# Patient Record
Sex: FEMALE | Race: BLACK OR AFRICAN AMERICAN | NOT HISPANIC OR LATINO
[De-identification: names, ages, dates, MRNs, and addresses within clinical notes are randomized per-mention and may not be internally consistent; named-entity substitution may affect disease eponyms.]

---

## 2019-05-21 ENCOUNTER — RESULT REVIEW (OUTPATIENT)
Age: 54
End: 2019-05-21

## 2023-03-29 ENCOUNTER — OFFICE (OUTPATIENT)
Dept: URBAN - METROPOLITAN AREA CLINIC 12 | Facility: CLINIC | Age: 58
Setting detail: OPHTHALMOLOGY
End: 2023-03-29
Payer: COMMERCIAL

## 2023-03-29 DIAGNOSIS — H25.13: ICD-10-CM

## 2023-03-29 DIAGNOSIS — H35.3131: ICD-10-CM

## 2023-03-29 DIAGNOSIS — H35.372: ICD-10-CM

## 2023-03-29 PROCEDURE — 92250 FUNDUS PHOTOGRAPHY W/I&R: CPT | Performed by: OPHTHALMOLOGY

## 2023-03-29 PROCEDURE — 99204 OFFICE O/P NEW MOD 45 MIN: CPT | Performed by: OPHTHALMOLOGY

## 2023-03-29 ASSESSMENT — KERATOMETRY
OD_K2POWER_DIOPTERS: 46.00
OS_AXISANGLE_DEGREES: 103
OD_K1POWER_DIOPTERS: 43.00
OD_AXISANGLE_DEGREES: 88
OS_K1POWER_DIOPTERS: 43.50
OS_K2POWER_DIOPTERS: 45.75

## 2023-03-29 ASSESSMENT — AXIALLENGTH_DERIVED
OS_AL: 23.9637
OS_AL: 23.9637
OD_AL: 24.5264
OD_AL: 24.5264

## 2023-03-29 ASSESSMENT — REFRACTION_CURRENTRX
OS_SPHERE: -1.25
OD_SPHERE: -0.75
OS_CYLINDER: -1.75
OD_OVR_VA: 20/
OD_VPRISM_DIRECTION: PROGS
OD_AXIS: 13
OS_AXIS: 13
OS_VPRISM_DIRECTION: PROGS
OS_OVR_VA: 20/
OD_CYLINDER: -2.75
OS_ADD: +2.75
OD_ADD: +2.75

## 2023-03-29 ASSESSMENT — REFRACTION_AUTOREFRACTION
OD_AXIS: 8
OS_CYLINDER: -2.50
OD_SPHERE: -1.75
OD_CYLINDER: -3.00
OS_AXIS: 15
OS_SPHERE: -0.75

## 2023-03-29 ASSESSMENT — REFRACTION_MANIFEST
OD_AXIS: 8
OS_SPHERE: -0.75
OD_SPHERE: -1.75
OS_AXIS: 15
OS_CYLINDER: -2.50
OD_CYLINDER: -3.00
OD_VA1: 20/40-1
OS_VA1: 20/25

## 2023-03-29 ASSESSMENT — SPHEQUIV_DERIVED
OS_SPHEQUIV: -2
OD_SPHEQUIV: -3.25
OS_SPHEQUIV: -2
OD_SPHEQUIV: -3.25

## 2023-03-29 ASSESSMENT — TONOMETRY
OD_IOP_MMHG: 15
OS_IOP_MMHG: 15

## 2023-03-29 ASSESSMENT — CONFRONTATIONAL VISUAL FIELD TEST (CVF)
OD_FINDINGS: FULL
OS_FINDINGS: FULL

## 2023-03-29 ASSESSMENT — VISUAL ACUITY
OD_BCVA: 20/60
OS_BCVA: 20/150

## 2023-05-15 ENCOUNTER — TRANSCRIPTION ENCOUNTER (OUTPATIENT)
Age: 58
End: 2023-05-15

## 2023-05-22 ENCOUNTER — TRANSCRIPTION ENCOUNTER (OUTPATIENT)
Age: 58
End: 2023-05-22

## 2023-05-24 ENCOUNTER — TRANSCRIPTION ENCOUNTER (OUTPATIENT)
Age: 58
End: 2023-05-24

## 2023-06-19 ENCOUNTER — TRANSCRIPTION ENCOUNTER (OUTPATIENT)
Age: 58
End: 2023-06-19

## 2023-06-20 ENCOUNTER — TRANSCRIPTION ENCOUNTER (OUTPATIENT)
Age: 58
End: 2023-06-20

## 2023-07-11 ENCOUNTER — TRANSCRIPTION ENCOUNTER (OUTPATIENT)
Age: 58
End: 2023-07-11

## 2023-07-19 ENCOUNTER — OFFICE (OUTPATIENT)
Dept: URBAN - METROPOLITAN AREA CLINIC 12 | Facility: CLINIC | Age: 58
Setting detail: OPHTHALMOLOGY
End: 2023-07-19
Payer: COMMERCIAL

## 2023-07-19 DIAGNOSIS — H25.13: ICD-10-CM

## 2023-07-19 DIAGNOSIS — H25.11: ICD-10-CM

## 2023-07-19 PROCEDURE — 99213 OFFICE O/P EST LOW 20 MIN: CPT | Performed by: OPHTHALMOLOGY

## 2023-07-19 PROCEDURE — 92136 OPHTHALMIC BIOMETRY: CPT | Performed by: OPHTHALMOLOGY

## 2023-07-19 ASSESSMENT — REFRACTION_CURRENTRX
OD_AXIS: 13
OS_VPRISM_DIRECTION: PROGS
OD_CYLINDER: -2.75
OD_VPRISM_DIRECTION: PROGS
OD_SPHERE: -0.75
OS_OVR_VA: 20/
OS_CYLINDER: -1.75
OS_AXIS: 13
OD_OVR_VA: 20/
OS_SPHERE: -1.25
OD_ADD: +2.75
OS_ADD: +2.75

## 2023-07-19 ASSESSMENT — AXIALLENGTH_DERIVED
OS_AL: 24.0588
OD_AL: 24.5761
OD_AL: 25.1716
OS_AL: 24.0588

## 2023-07-19 ASSESSMENT — REFRACTION_AUTOREFRACTION
OD_CYLINDER: -2.75
OD_AXIS: 010
OD_SPHERE: -3.25
OS_CYLINDER: -2.00
OS_AXIS: 015
OS_SPHERE: -1.00

## 2023-07-19 ASSESSMENT — CONFRONTATIONAL VISUAL FIELD TEST (CVF)
OS_FINDINGS: FULL
OD_FINDINGS: FULL

## 2023-07-19 ASSESSMENT — TONOMETRY
OS_IOP_MMHG: 17
OD_IOP_MMHG: 16

## 2023-07-19 ASSESSMENT — REFRACTION_MANIFEST
OS_CYLINDER: -2.50
OS_SPHERE: -0.75
OS_AXIS: 15
OD_VA1: 20/40-1
OD_AXIS: 8
OD_SPHERE: -1.75
OS_VA1: 20/25
OD_CYLINDER: -3.00

## 2023-07-19 ASSESSMENT — SPHEQUIV_DERIVED
OS_SPHEQUIV: -2
OD_SPHEQUIV: -3.25
OS_SPHEQUIV: -2
OD_SPHEQUIV: -4.625

## 2023-07-19 ASSESSMENT — KERATOMETRY
OS_K2POWER_DIOPTERS: 45.25
OD_K1POWER_DIOPTERS: 43.25
OS_K1POWER_DIOPTERS: 43.50
OD_AXISANGLE_DEGREES: 091
OS_AXISANGLE_DEGREES: 097
OD_K2POWER_DIOPTERS: 45.50

## 2023-07-19 ASSESSMENT — VISUAL ACUITY
OS_BCVA: 20/80-1
OD_BCVA: 20/25-1

## 2023-08-01 ENCOUNTER — ASC (OUTPATIENT)
Dept: URBAN - METROPOLITAN AREA SURGERY 8 | Facility: SURGERY | Age: 58
Setting detail: OPHTHALMOLOGY
End: 2023-08-01
Payer: COMMERCIAL

## 2023-08-01 DIAGNOSIS — H52.211: ICD-10-CM

## 2023-08-01 DIAGNOSIS — H25.11: ICD-10-CM

## 2023-08-01 PROCEDURE — FEMTO FEMTOSECOND LASER: Performed by: OPHTHALMOLOGY

## 2023-08-01 PROCEDURE — V2787 ASTIGMATISM-CORRECT FUNCTION: HCPCS | Performed by: OPHTHALMOLOGY

## 2023-08-01 PROCEDURE — 66984 XCAPSL CTRC RMVL W/O ECP: CPT | Performed by: OPHTHALMOLOGY

## 2023-08-02 ENCOUNTER — RX ONLY (RX ONLY)
Age: 58
End: 2023-08-02

## 2023-08-02 ENCOUNTER — OFFICE (OUTPATIENT)
Dept: URBAN - METROPOLITAN AREA CLINIC 12 | Facility: CLINIC | Age: 58
Setting detail: OPHTHALMOLOGY
End: 2023-08-02
Payer: COMMERCIAL

## 2023-08-02 DIAGNOSIS — Z96.1: ICD-10-CM

## 2023-08-02 PROCEDURE — 99024 POSTOP FOLLOW-UP VISIT: CPT | Performed by: OPTOMETRIST

## 2023-08-02 ASSESSMENT — KERATOMETRY
OD_AXISANGLE_DEGREES: 089
OS_K1POWER_DIOPTERS: 43.25
METHOD_AUTO_MANUAL: AUTO
OD_K2POWER_DIOPTERS: 45.50
OS_AXISANGLE_DEGREES: 104
OD_K1POWER_DIOPTERS: 43.00
OS_K2POWER_DIOPTERS: 45.75

## 2023-08-02 ASSESSMENT — REFRACTION_CURRENTRX
OD_ADD: +2.75
OS_AXIS: 13
OS_CYLINDER: -1.75
OS_ADD: +2.75
OD_AXIS: 13
OD_VPRISM_DIRECTION: PROGS
OS_OVR_VA: 20/
OD_CYLINDER: -2.75
OD_OVR_VA: 20/
OD_SPHERE: -0.75
OS_SPHERE: -1.25
OS_VPRISM_DIRECTION: PROGS

## 2023-08-02 ASSESSMENT — VISUAL ACUITY
OS_BCVA: 20/30-2
OD_BCVA: 20/50-2

## 2023-08-02 ASSESSMENT — CONFRONTATIONAL VISUAL FIELD TEST (CVF)
OD_FINDINGS: FULL
OS_FINDINGS: FULL

## 2023-08-02 ASSESSMENT — REFRACTION_AUTOREFRACTION
OS_CYLINDER: -2.00
OD_SPHERE: -0.25
OS_SPHERE: -0.75
OD_CYLINDER: -1.00
OD_AXIS: 172
OS_AXIS: 018

## 2023-08-02 ASSESSMENT — REFRACTION_MANIFEST
OD_AXIS: 8
OS_SPHERE: -0.75
OS_VA1: 20/25
OD_CYLINDER: -3.00
OD_VA1: 20/40-1
OS_AXIS: 15
OD_SPHERE: -1.75
OS_CYLINDER: -2.50

## 2023-08-02 ASSESSMENT — SPHEQUIV_DERIVED
OS_SPHEQUIV: -2
OD_SPHEQUIV: -3.25
OS_SPHEQUIV: -1.75
OD_SPHEQUIV: -0.75

## 2023-08-02 ASSESSMENT — AXIALLENGTH_DERIVED
OD_AL: 24.6261
OS_AL: 24.0111
OD_AL: 23.6086
OS_AL: 23.9107

## 2023-08-02 ASSESSMENT — TONOMETRY: OS_IOP_MMHG: 17

## 2023-08-07 ENCOUNTER — OFFICE (OUTPATIENT)
Dept: URBAN - METROPOLITAN AREA CLINIC 12 | Facility: CLINIC | Age: 58
Setting detail: OPHTHALMOLOGY
End: 2023-08-07
Payer: COMMERCIAL

## 2023-08-07 DIAGNOSIS — H25.12: ICD-10-CM

## 2023-08-07 PROCEDURE — 92136 OPHTHALMIC BIOMETRY: CPT | Performed by: OPHTHALMOLOGY

## 2023-08-07 ASSESSMENT — REFRACTION_MANIFEST
OD_AXIS: 8
OS_AXIS: 15
OD_CYLINDER: -3.00
OS_VA1: 20/25
OD_VA1: 20/40-1
OS_SPHERE: -0.75
OS_CYLINDER: -2.50
OD_SPHERE: -1.75

## 2023-08-07 ASSESSMENT — TONOMETRY
OS_IOP_MMHG: 14
OD_IOP_MMHG: 16

## 2023-08-07 ASSESSMENT — REFRACTION_CURRENTRX
OS_OVR_VA: 20/
OD_OVR_VA: 20/
OD_ADD: +2.75
OD_CYLINDER: -2.75
OD_VPRISM_DIRECTION: PROGS
OS_VPRISM_DIRECTION: PROGS
OD_SPHERE: -0.75
OS_AXIS: 13
OD_AXIS: 13
OS_ADD: +2.75
OS_SPHERE: -1.25
OS_CYLINDER: -1.75

## 2023-08-07 ASSESSMENT — SPHEQUIV_DERIVED
OD_SPHEQUIV: -3.25
OS_SPHEQUIV: -2
OS_SPHEQUIV: -2
OD_SPHEQUIV: -0.5

## 2023-08-07 ASSESSMENT — REFRACTION_AUTOREFRACTION
OS_SPHERE: -0.75
OD_SPHERE: -0.25
OS_CYLINDER: -2.50
OD_AXIS: 007
OD_CYLINDER: -0.50
OS_AXIS: 012

## 2023-08-07 ASSESSMENT — AXIALLENGTH_DERIVED
OD_AL: 23.4207
OD_AL: 24.5264
OS_AL: 24.0111
OS_AL: 24.0111

## 2023-08-07 ASSESSMENT — KERATOMETRY
OS_AXISANGLE_DEGREES: 099
OS_K2POWER_DIOPTERS: 45.75
METHOD_AUTO_MANUAL: AUTO
OD_K2POWER_DIOPTERS: 45.75
OS_K1POWER_DIOPTERS: 43.25
OD_K1POWER_DIOPTERS: 43.25
OD_AXISANGLE_DEGREES: 090

## 2023-08-07 ASSESSMENT — VISUAL ACUITY
OD_BCVA: 20/60-1
OS_BCVA: 20/30-2

## 2023-08-07 ASSESSMENT — CONFRONTATIONAL VISUAL FIELD TEST (CVF)
OS_FINDINGS: FULL
OD_FINDINGS: FULL

## 2023-08-15 ENCOUNTER — ASC (OUTPATIENT)
Dept: URBAN - METROPOLITAN AREA SURGERY 8 | Facility: SURGERY | Age: 58
Setting detail: OPHTHALMOLOGY
End: 2023-08-15
Payer: COMMERCIAL

## 2023-08-15 DIAGNOSIS — H25.12: ICD-10-CM

## 2023-08-15 DIAGNOSIS — H52.212: ICD-10-CM

## 2023-08-15 PROCEDURE — 66984 XCAPSL CTRC RMVL W/O ECP: CPT | Performed by: OPHTHALMOLOGY

## 2023-08-15 PROCEDURE — V2787 ASTIGMATISM-CORRECT FUNCTION: HCPCS | Performed by: OPHTHALMOLOGY

## 2023-08-15 PROCEDURE — FEMTO FEMTOSECOND LASER: Performed by: OPHTHALMOLOGY

## 2023-08-16 ENCOUNTER — OFFICE (OUTPATIENT)
Dept: URBAN - METROPOLITAN AREA CLINIC 12 | Facility: CLINIC | Age: 58
Setting detail: OPHTHALMOLOGY
End: 2023-08-16
Payer: COMMERCIAL

## 2023-08-16 DIAGNOSIS — Z96.1: ICD-10-CM

## 2023-08-16 PROCEDURE — 99024 POSTOP FOLLOW-UP VISIT: CPT | Performed by: OPTOMETRIST

## 2023-08-16 ASSESSMENT — REFRACTION_CURRENTRX
OS_VPRISM_DIRECTION: PROGS
OD_AXIS: 13
OS_ADD: +2.75
OD_VPRISM_DIRECTION: PROGS
OS_SPHERE: -1.25
OS_AXIS: 13
OD_CYLINDER: -2.75
OD_OVR_VA: 20/
OS_CYLINDER: -1.75
OD_ADD: +2.75
OD_SPHERE: -0.75
OS_OVR_VA: 20/

## 2023-08-16 ASSESSMENT — REFRACTION_AUTOREFRACTION
OS_CYLINDER: -1.00
OD_CYLINDER: -0.25
OS_AXIS: 174
OS_SPHERE: PLANO
OD_SPHERE: -0.25
OD_AXIS: 179

## 2023-08-16 ASSESSMENT — VISUAL ACUITY
OD_BCVA: 20/25-2
OS_BCVA: 20/20-2

## 2023-08-16 ASSESSMENT — CONFRONTATIONAL VISUAL FIELD TEST (CVF)
OS_FINDINGS: FULL
OD_FINDINGS: FULL

## 2023-08-16 ASSESSMENT — KERATOMETRY
OD_K1POWER_DIOPTERS: 43.00
OD_K2POWER_DIOPTERS: 45.50
OS_AXISANGLE_DEGREES: 099
OS_K1POWER_DIOPTERS: 43.00
OS_K2POWER_DIOPTERS: 45.75
OD_AXISANGLE_DEGREES: 093
METHOD_AUTO_MANUAL: AUTO

## 2023-08-16 ASSESSMENT — SPHEQUIV_DERIVED
OS_SPHEQUIV: -2
OD_SPHEQUIV: -3.25
OD_SPHEQUIV: -0.375

## 2023-08-16 ASSESSMENT — REFRACTION_MANIFEST
OD_VA1: 20/40-1
OD_CYLINDER: -3.00
OS_VA1: 20/25
OS_SPHERE: -0.75
OS_CYLINDER: -2.50
OS_AXIS: 15
OD_AXIS: 8
OD_SPHERE: -1.75

## 2023-08-16 ASSESSMENT — TONOMETRY
OD_IOP_MMHG: 18
OS_IOP_MMHG: 18

## 2023-08-16 ASSESSMENT — AXIALLENGTH_DERIVED
OD_AL: 24.6261
OS_AL: 24.0588
OD_AL: 23.4632

## 2023-08-24 ENCOUNTER — OFFICE (OUTPATIENT)
Dept: URBAN - METROPOLITAN AREA CLINIC 12 | Facility: CLINIC | Age: 58
Setting detail: OPHTHALMOLOGY
End: 2023-08-24
Payer: COMMERCIAL

## 2023-08-24 DIAGNOSIS — Z96.1: ICD-10-CM

## 2023-08-24 PROCEDURE — 99024 POSTOP FOLLOW-UP VISIT: CPT | Performed by: OPHTHALMOLOGY

## 2023-08-24 ASSESSMENT — TONOMETRY
OS_IOP_MMHG: 15
OD_IOP_MMHG: 17

## 2023-08-24 ASSESSMENT — SPHEQUIV_DERIVED
OD_SPHEQUIV: -0.375
OS_SPHEQUIV: -0.375
OS_SPHEQUIV: -2
OD_SPHEQUIV: -3.25

## 2023-08-24 ASSESSMENT — REFRACTION_CURRENTRX
OD_CYLINDER: -2.75
OS_SPHERE: -1.25
OD_ADD: +2.75
OD_VPRISM_DIRECTION: PROGS
OS_CYLINDER: -1.75
OS_VPRISM_DIRECTION: PROGS
OD_AXIS: 13
OS_AXIS: 13
OS_OVR_VA: 20/
OS_ADD: +2.75
OD_SPHERE: -0.75
OD_OVR_VA: 20/

## 2023-08-24 ASSESSMENT — REFRACTION_MANIFEST
OD_SPHERE: -1.75
OS_CYLINDER: -2.50
OS_VA1: 20/25
OD_VA1: 20/40-1
OD_AXIS: 8
OS_AXIS: 15
OS_SPHERE: -0.75
OD_CYLINDER: -3.00

## 2023-08-24 ASSESSMENT — REFRACTION_AUTOREFRACTION
OD_AXIS: 027
OS_SPHERE: +0.25
OS_AXIS: 168
OD_SPHERE: -0.25
OD_CYLINDER: -0.25
OS_CYLINDER: -1.25

## 2023-08-24 ASSESSMENT — AXIALLENGTH_DERIVED
OS_AL: 24.0588
OS_AL: 23.4179
OD_AL: 23.4632
OD_AL: 24.6261

## 2023-08-24 ASSESSMENT — VISUAL ACUITY
OD_BCVA: 20/20
OS_BCVA: 20/30-1

## 2023-08-24 ASSESSMENT — KERATOMETRY
OD_AXISANGLE_DEGREES: 094
OD_K1POWER_DIOPTERS: 43.00
METHOD_AUTO_MANUAL: AUTO
OS_K1POWER_DIOPTERS: 43.00
OS_AXISANGLE_DEGREES: 099
OD_K2POWER_DIOPTERS: 45.50
OS_K2POWER_DIOPTERS: 45.75

## 2023-08-24 ASSESSMENT — CONFRONTATIONAL VISUAL FIELD TEST (CVF)
OD_FINDINGS: FULL
OS_FINDINGS: FULL

## 2023-09-19 ENCOUNTER — OFFICE (OUTPATIENT)
Dept: URBAN - METROPOLITAN AREA CLINIC 12 | Facility: CLINIC | Age: 58
Setting detail: OPHTHALMOLOGY
End: 2023-09-19
Payer: COMMERCIAL

## 2023-09-19 DIAGNOSIS — Z96.1: ICD-10-CM

## 2023-09-19 PROCEDURE — 99024 POSTOP FOLLOW-UP VISIT: CPT | Performed by: OPHTHALMOLOGY

## 2023-09-19 ASSESSMENT — AXIALLENGTH_DERIVED
OS_AL: 23.9637
OD_AL: 25.9284
OD_AL: 27.2255

## 2023-09-19 ASSESSMENT — KERATOMETRY
OS_AXISANGLE_DEGREES: 098
METHOD_AUTO_MANUAL: AUTO
OS_K2POWER_DIOPTERS: 46.00
OD_AXISANGLE_DEGREES: 006
OS_K1POWER_DIOPTERS: 43.25
OD_K1POWER_DIOPTERS: 33.75
OD_K2POWER_DIOPTERS: 43.00

## 2023-09-19 ASSESSMENT — VISUAL ACUITY
OD_BCVA: 20/25-1
OS_BCVA: 20/25-2

## 2023-09-19 ASSESSMENT — REFRACTION_MANIFEST
OS_CYLINDER: -2.50
OS_AXIS: 15
OS_SPHERE: -0.75
OD_VA1: 20/40-1
OD_CYLINDER: -3.00
OD_SPHERE: -1.75
OS_VA1: 20/25
OD_AXIS: 8

## 2023-09-19 ASSESSMENT — REFRACTION_CURRENTRX
OD_OVR_VA: 20/
OS_VPRISM_DIRECTION: PROGS
OS_AXIS: 13
OS_OVR_VA: 20/
OD_CYLINDER: -2.75
OS_SPHERE: -1.25
OS_ADD: +2.75
OD_VPRISM_DIRECTION: PROGS
OD_SPHERE: -0.75
OS_CYLINDER: -1.75
OD_ADD: +2.75
OD_AXIS: 13

## 2023-09-19 ASSESSMENT — SPHEQUIV_DERIVED
OS_SPHEQUIV: -2
OD_SPHEQUIV: -3.25
OD_SPHEQUIV: -0.625

## 2023-09-19 ASSESSMENT — REFRACTION_AUTOREFRACTION
OS_SPHERE: PLANO
OS_CYLINDER: -1.50
OD_AXIS: 004
OD_SPHERE: -0.25
OS_AXIS: 174
OD_CYLINDER: -0.75

## 2023-09-19 ASSESSMENT — TONOMETRY: OS_IOP_MMHG: 14

## 2023-09-19 ASSESSMENT — CONFRONTATIONAL VISUAL FIELD TEST (CVF)
OD_FINDINGS: FULL
OS_FINDINGS: FULL

## 2023-09-28 ENCOUNTER — OFFICE (OUTPATIENT)
Dept: URBAN - METROPOLITAN AREA CLINIC 12 | Facility: CLINIC | Age: 58
Setting detail: OPHTHALMOLOGY
End: 2023-09-28
Payer: COMMERCIAL

## 2023-09-28 DIAGNOSIS — Z96.1: ICD-10-CM

## 2023-09-28 PROCEDURE — 99024 POSTOP FOLLOW-UP VISIT: CPT | Performed by: OPHTHALMOLOGY

## 2023-09-28 ASSESSMENT — REFRACTION_CURRENTRX
OD_VPRISM_DIRECTION: PROGS
OS_CYLINDER: -1.75
OD_CYLINDER: -2.75
OS_OVR_VA: 20/
OD_SPHERE: -0.75
OD_ADD: +2.75
OD_AXIS: 13
OS_SPHERE: -1.25
OS_AXIS: 13
OS_VPRISM_DIRECTION: PROGS
OS_ADD: +2.75
OD_OVR_VA: 20/

## 2023-09-28 ASSESSMENT — TONOMETRY: OD_IOP_MMHG: 10

## 2023-09-28 ASSESSMENT — KERATOMETRY
OD_AXISANGLE_DEGREES: 96
OD_K2POWER_DIOPTERS: 45.25
OD_K1POWER_DIOPTERS: 43.00
OS_K2POWER_DIOPTERS: 46.75
METHOD_AUTO_MANUAL: AUTO
OS_K1POWER_DIOPTERS: 43.25
OS_AXISANGLE_DEGREES: 99

## 2023-09-28 ASSESSMENT — REFRACTION_MANIFEST
OD_VA1: 20/40-1
OS_CYLINDER: -2.50
OS_VA1: 20/25
OS_SPHERE: -0.75
OD_SPHERE: -1.75
OD_CYLINDER: -3.00
OD_AXIS: 8
OS_AXIS: 15

## 2023-09-28 ASSESSMENT — REFRACTION_AUTOREFRACTION
OD_AXIS: 7
OS_CYLINDER: -1.25
OD_SPHERE: PLANO
OS_AXIS: 171
OD_CYLINDER: -1.00
OS_SPHERE: PLANO

## 2023-09-28 ASSESSMENT — VISUAL ACUITY
OD_BCVA: 20/25+1
OS_BCVA: 20/25-1

## 2023-09-28 ASSESSMENT — AXIALLENGTH_DERIVED
OD_AL: 24.6762
OS_AL: 23.8224

## 2023-09-28 ASSESSMENT — CONFRONTATIONAL VISUAL FIELD TEST (CVF)
OD_FINDINGS: FULL
OS_FINDINGS: FULL

## 2023-09-28 ASSESSMENT — SPHEQUIV_DERIVED
OS_SPHEQUIV: -2
OD_SPHEQUIV: -3.25

## 2023-10-09 ENCOUNTER — OFFICE (OUTPATIENT)
Dept: URBAN - METROPOLITAN AREA CLINIC 12 | Facility: CLINIC | Age: 58
Setting detail: OPHTHALMOLOGY
End: 2023-10-09
Payer: COMMERCIAL

## 2023-10-09 DIAGNOSIS — Z96.1: ICD-10-CM

## 2023-10-09 PROCEDURE — 99024 POSTOP FOLLOW-UP VISIT: CPT | Performed by: OPTOMETRIST

## 2023-10-09 ASSESSMENT — CONFRONTATIONAL VISUAL FIELD TEST (CVF)
OD_FINDINGS: FULL
OS_FINDINGS: FULL

## 2023-10-09 ASSESSMENT — VISUAL ACUITY
OD_BCVA: 20/25
OS_BCVA: 20/25

## 2023-10-09 ASSESSMENT — REFRACTION_CURRENTRX
OD_OVR_VA: 20/
OD_AXIS: 13
OD_VPRISM_DIRECTION: PROGS
OS_VPRISM_DIRECTION: PROGS
OD_SPHERE: -0.75
OS_ADD: +2.75
OS_CYLINDER: -1.75
OD_CYLINDER: -2.75
OS_AXIS: 13
OS_SPHERE: -1.25
OD_ADD: +2.75
OS_OVR_VA: 20/

## 2023-10-09 ASSESSMENT — REFRACTION_MANIFEST
OD_SPHERE: -1.75
OS_AXIS: 15
OS_VA1: 20/25
OD_AXIS: 8
OD_CYLINDER: -3.00
OS_SPHERE: -0.75
OS_CYLINDER: -2.50
OD_VA1: 20/40-1

## 2023-10-09 ASSESSMENT — AXIALLENGTH_DERIVED
OD_AL: 23.4687
OD_AL: 24.5264
OS_AL: 23.9637

## 2023-10-09 ASSESSMENT — KERATOMETRY
OD_K1POWER_DIOPTERS: 43.25
METHOD_AUTO_MANUAL: AUTO
OD_K2POWER_DIOPTERS: 45.75
OS_K2POWER_DIOPTERS: 46.25
OD_AXISANGLE_DEGREES: 93
OS_K1POWER_DIOPTERS: 43.00
OS_AXISANGLE_DEGREES: 97

## 2023-10-09 ASSESSMENT — REFRACTION_AUTOREFRACTION
OS_CYLINDER: -1.25
OD_AXIS: 6
OS_AXIS: 169
OD_CYLINDER: -0.75
OD_SPHERE: -0.25
OS_SPHERE: PLANO

## 2023-10-09 ASSESSMENT — TONOMETRY
OS_IOP_MMHG: 14
OD_IOP_MMHG: 14

## 2023-10-09 ASSESSMENT — SPHEQUIV_DERIVED
OD_SPHEQUIV: -0.625
OD_SPHEQUIV: -3.25
OS_SPHEQUIV: -2

## 2024-01-09 ENCOUNTER — RX ONLY (RX ONLY)
Age: 59
End: 2024-01-09

## 2024-01-09 ENCOUNTER — OFFICE (OUTPATIENT)
Dept: URBAN - METROPOLITAN AREA CLINIC 12 | Facility: CLINIC | Age: 59
Setting detail: OPHTHALMOLOGY
End: 2024-01-09
Payer: COMMERCIAL

## 2024-01-09 DIAGNOSIS — H35.3131: ICD-10-CM

## 2024-01-09 DIAGNOSIS — Z96.1: ICD-10-CM

## 2024-01-09 DIAGNOSIS — H16.223: ICD-10-CM

## 2024-01-09 DIAGNOSIS — H35.372: ICD-10-CM

## 2024-01-09 PROBLEM — H52.7 REFRACTIVE ERROR: Status: ACTIVE | Noted: 2024-01-09

## 2024-01-09 PROCEDURE — 92134 CPTRZ OPH DX IMG PST SGM RTA: CPT | Performed by: OPTOMETRIST

## 2024-01-09 PROCEDURE — 92014 COMPRE OPH EXAM EST PT 1/>: CPT | Performed by: OPTOMETRIST

## 2024-01-09 ASSESSMENT — REFRACTION_AUTOREFRACTION
OS_CYLINDER: -1.00
OS_SPHERE: -0.25
OS_AXIS: 166
OD_CYLINDER: -0.75
OD_SPHERE: -0.25
OD_AXIS: 015

## 2024-01-09 ASSESSMENT — REFRACTION_MANIFEST
OS_ADD: +2.75
OD_CYLINDER: -0.50
OD_ADD: +2.75
OS_SPHERE: PLANO
OS_VA1: 20/25
OD_VA1: 20/40-1
OD_SPHERE: -0.25
OS_AXIS: 160
OD_AXIS: 005
OS_CYLINDER: -0.75

## 2024-01-09 ASSESSMENT — CONFRONTATIONAL VISUAL FIELD TEST (CVF)
OD_FINDINGS: FULL
OS_FINDINGS: FULL

## 2024-01-09 ASSESSMENT — REFRACTION_CURRENTRX
OS_AXIS: 13
OD_ADD: +2.75
OD_OVR_VA: 20/
OS_ADD: +2.75
OS_OVR_VA: 20/
OD_VPRISM_DIRECTION: PROGS
OS_SPHERE: -1.25
OD_CYLINDER: -2.75
OD_AXIS: 13
OD_SPHERE: -0.75
OS_CYLINDER: -1.75
OS_VPRISM_DIRECTION: PROGS

## 2024-01-09 ASSESSMENT — SPHEQUIV_DERIVED
OS_SPHEQUIV: -0.75
OD_SPHEQUIV: -0.625
OD_SPHEQUIV: -0.5

## 2024-01-09 ASSESSMENT — SUPERFICIAL PUNCTATE KERATITIS (SPK)
OD_SPK: 1+
OS_SPK: 1+

## 2024-03-13 ENCOUNTER — OFFICE (OUTPATIENT)
Dept: URBAN - METROPOLITAN AREA CLINIC 12 | Facility: CLINIC | Age: 59
Setting detail: OPHTHALMOLOGY
End: 2024-03-13
Payer: COMMERCIAL

## 2024-03-13 ENCOUNTER — RX ONLY (RX ONLY)
Age: 59
End: 2024-03-13

## 2024-03-13 DIAGNOSIS — H16.223: ICD-10-CM

## 2024-03-13 DIAGNOSIS — Z96.1: ICD-10-CM

## 2024-03-13 PROCEDURE — 92012 INTRM OPH EXAM EST PATIENT: CPT | Performed by: OPTOMETRIST

## 2024-03-13 ASSESSMENT — REFRACTION_MANIFEST
OD_VA1: 20/40-1
OS_ADD: +2.75
OS_CYLINDER: -0.75
OS_VA1: 20/25
OD_AXIS: 005
OS_SPHERE: PLANO
OS_AXIS: 160
OD_SPHERE: -0.25
OD_CYLINDER: -0.50
OD_ADD: +2.75

## 2024-03-13 ASSESSMENT — REFRACTION_CURRENTRX
OS_AXIS: 13
OD_CYLINDER: -2.75
OS_OVR_VA: 20/
OS_CYLINDER: -1.75
OD_AXIS: 13
OS_VPRISM_DIRECTION: PROGS
OD_SPHERE: -0.75
OS_SPHERE: -1.25
OD_ADD: +2.75
OD_OVR_VA: 20/
OD_VPRISM_DIRECTION: PROGS
OS_ADD: +2.75

## 2024-03-13 ASSESSMENT — SPHEQUIV_DERIVED: OD_SPHEQUIV: -0.5

## 2024-03-26 ENCOUNTER — APPOINTMENT (OUTPATIENT)
Dept: MRI IMAGING | Facility: CLINIC | Age: 59
End: 2024-03-26

## 2024-03-26 ENCOUNTER — APPOINTMENT (OUTPATIENT)
Dept: RADIOLOGY | Facility: CLINIC | Age: 59
End: 2024-03-26
Payer: COMMERCIAL

## 2024-03-26 ENCOUNTER — OUTPATIENT (OUTPATIENT)
Dept: OUTPATIENT SERVICES | Facility: HOSPITAL | Age: 59
LOS: 1 days | End: 2024-03-26
Payer: COMMERCIAL

## 2024-03-26 DIAGNOSIS — Z98.89 OTHER SPECIFIED POSTPROCEDURAL STATES: Chronic | ICD-10-CM

## 2024-03-26 DIAGNOSIS — Z00.8 ENCOUNTER FOR OTHER GENERAL EXAMINATION: ICD-10-CM

## 2024-03-26 PROCEDURE — 70544 MR ANGIOGRAPHY HEAD W/O DYE: CPT | Mod: 26

## 2024-03-26 PROCEDURE — 70544 MR ANGIOGRAPHY HEAD W/O DYE: CPT

## 2024-03-26 PROCEDURE — 72040 X-RAY EXAM NECK SPINE 2-3 VW: CPT

## 2024-03-26 PROCEDURE — 72040 X-RAY EXAM NECK SPINE 2-3 VW: CPT | Mod: 26

## 2024-04-17 ENCOUNTER — APPOINTMENT (OUTPATIENT)
Dept: CT IMAGING | Facility: CLINIC | Age: 59
End: 2024-04-17

## 2024-06-03 ENCOUNTER — NON-APPOINTMENT (OUTPATIENT)
Age: 59
End: 2024-06-03

## 2024-06-03 VITALS — BODY MASS INDEX: 21.4 KG/M2 | WEIGHT: 130 LBS | HEIGHT: 65.5 IN

## 2024-06-03 DIAGNOSIS — Z87.891 PERSONAL HISTORY OF NICOTINE DEPENDENCE: ICD-10-CM

## 2024-06-03 NOTE — HISTORY OF PRESENT ILLNESS
[Former] : Former [TextBox_[id number]] : Ms. FONSECA is a 59 year old female.   She was called to review eligibility for Low-Dose CT lung cancer screening.  Reviewed and confirmed that the patient meets screening eligibility criteria:   59 years old   Smoking Status: Former smoker    Number of pack(s) per day: 1 Number of years smoked: 30 Number of pack years smokin Quit year:    No symptoms of lung cancer, including new cough, change in cough, hemoptysis, and unintentional weight loss.   No personal history of lung cancer.  History lung cancer in a first degree relative, her father.  No history of lung disease or occupational exposures. [YearQuit] : 2023 [PacksperYear] : 35

## 2024-06-10 ENCOUNTER — OUTPATIENT (OUTPATIENT)
Dept: OUTPATIENT SERVICES | Facility: HOSPITAL | Age: 59
LOS: 1 days | End: 2024-06-10
Payer: COMMERCIAL

## 2024-06-10 ENCOUNTER — APPOINTMENT (OUTPATIENT)
Dept: CT IMAGING | Facility: CLINIC | Age: 59
End: 2024-06-10
Payer: COMMERCIAL

## 2024-06-10 DIAGNOSIS — Z01.818 ENCOUNTER FOR OTHER PREPROCEDURAL EXAMINATION: ICD-10-CM

## 2024-06-10 DIAGNOSIS — Z98.89 OTHER SPECIFIED POSTPROCEDURAL STATES: Chronic | ICD-10-CM

## 2024-06-10 PROCEDURE — 71271 CT THORAX LUNG CANCER SCR C-: CPT

## 2024-06-10 PROCEDURE — 71271 CT THORAX LUNG CANCER SCR C-: CPT | Mod: 26

## 2024-06-20 ENCOUNTER — TRANSCRIPTION ENCOUNTER (OUTPATIENT)
Age: 59
End: 2024-06-20

## 2024-10-17 ENCOUNTER — APPOINTMENT (OUTPATIENT)
Dept: CT IMAGING | Facility: CLINIC | Age: 59
End: 2024-10-17
Payer: COMMERCIAL

## 2024-10-17 ENCOUNTER — OUTPATIENT (OUTPATIENT)
Dept: OUTPATIENT SERVICES | Facility: HOSPITAL | Age: 59
LOS: 1 days | End: 2024-10-17
Payer: COMMERCIAL

## 2024-10-17 DIAGNOSIS — Z98.89 OTHER SPECIFIED POSTPROCEDURAL STATES: Chronic | ICD-10-CM

## 2024-10-17 DIAGNOSIS — Z00.8 ENCOUNTER FOR OTHER GENERAL EXAMINATION: ICD-10-CM

## 2024-10-17 PROCEDURE — 71250 CT THORAX DX C-: CPT

## 2024-10-17 PROCEDURE — 71250 CT THORAX DX C-: CPT | Mod: 26

## 2024-12-10 ENCOUNTER — OUTPATIENT (OUTPATIENT)
Dept: OUTPATIENT SERVICES | Facility: HOSPITAL | Age: 59
LOS: 1 days | End: 2024-12-10
Payer: COMMERCIAL

## 2024-12-10 ENCOUNTER — APPOINTMENT (OUTPATIENT)
Dept: RADIOLOGY | Facility: CLINIC | Age: 59
End: 2024-12-10
Payer: COMMERCIAL

## 2024-12-10 DIAGNOSIS — M25.551 PAIN IN RIGHT HIP: ICD-10-CM

## 2024-12-10 DIAGNOSIS — Z98.89 OTHER SPECIFIED POSTPROCEDURAL STATES: Chronic | ICD-10-CM

## 2024-12-10 DIAGNOSIS — Z00.8 ENCOUNTER FOR OTHER GENERAL EXAMINATION: ICD-10-CM

## 2024-12-10 PROCEDURE — 73502 X-RAY EXAM HIP UNI 2-3 VIEWS: CPT | Mod: 26,RT

## 2024-12-10 PROCEDURE — 73552 X-RAY EXAM OF FEMUR 2/>: CPT | Mod: 26,RT

## 2024-12-10 PROCEDURE — 73502 X-RAY EXAM HIP UNI 2-3 VIEWS: CPT

## 2024-12-10 PROCEDURE — 73552 X-RAY EXAM OF FEMUR 2/>: CPT

## 2024-12-28 ENCOUNTER — APPOINTMENT (OUTPATIENT)
Dept: CT IMAGING | Facility: CLINIC | Age: 59
End: 2024-12-28
Payer: COMMERCIAL

## 2024-12-28 ENCOUNTER — OUTPATIENT (OUTPATIENT)
Dept: OUTPATIENT SERVICES | Facility: HOSPITAL | Age: 59
LOS: 1 days | End: 2024-12-28
Payer: COMMERCIAL

## 2024-12-28 DIAGNOSIS — Z00.8 ENCOUNTER FOR OTHER GENERAL EXAMINATION: ICD-10-CM

## 2024-12-28 DIAGNOSIS — Z98.89 OTHER SPECIFIED POSTPROCEDURAL STATES: Chronic | ICD-10-CM

## 2024-12-28 DIAGNOSIS — M25.551 PAIN IN RIGHT HIP: ICD-10-CM

## 2024-12-28 PROCEDURE — 74176 CT ABD & PELVIS W/O CONTRAST: CPT | Mod: 26

## 2024-12-28 PROCEDURE — 73700 CT LOWER EXTREMITY W/O DYE: CPT

## 2024-12-28 PROCEDURE — 73700 CT LOWER EXTREMITY W/O DYE: CPT | Mod: 26,RT

## 2024-12-28 PROCEDURE — 74176 CT ABD & PELVIS W/O CONTRAST: CPT

## 2025-04-18 ENCOUNTER — APPOINTMENT (OUTPATIENT)
Dept: MRI IMAGING | Facility: CLINIC | Age: 60
End: 2025-04-18

## 2025-04-21 ENCOUNTER — APPOINTMENT (OUTPATIENT)
Dept: MRI IMAGING | Facility: CLINIC | Age: 60
End: 2025-04-21
Payer: COMMERCIAL

## 2025-04-21 ENCOUNTER — OUTPATIENT (OUTPATIENT)
Dept: OUTPATIENT SERVICES | Facility: HOSPITAL | Age: 60
LOS: 1 days | End: 2025-04-21
Payer: COMMERCIAL

## 2025-04-21 DIAGNOSIS — Z00.8 ENCOUNTER FOR OTHER GENERAL EXAMINATION: ICD-10-CM

## 2025-04-21 DIAGNOSIS — Z98.89 OTHER SPECIFIED POSTPROCEDURAL STATES: Chronic | ICD-10-CM

## 2025-04-21 DIAGNOSIS — M76.01 GLUTEAL TENDINITIS, RIGHT HIP: ICD-10-CM

## 2025-04-21 PROCEDURE — 73721 MRI JNT OF LWR EXTRE W/O DYE: CPT | Mod: 26,RT

## 2025-04-21 PROCEDURE — 73721 MRI JNT OF LWR EXTRE W/O DYE: CPT

## 2025-04-28 ENCOUNTER — RX ONLY (RX ONLY)
Age: 60
End: 2025-04-28

## 2025-04-28 ENCOUNTER — OFFICE (OUTPATIENT)
Dept: URBAN - METROPOLITAN AREA CLINIC 100 | Facility: CLINIC | Age: 60
Setting detail: OPHTHALMOLOGY
End: 2025-04-28
Payer: COMMERCIAL

## 2025-04-28 DIAGNOSIS — H50.15: ICD-10-CM

## 2025-04-28 PROCEDURE — 99213 OFFICE O/P EST LOW 20 MIN: CPT | Performed by: OPHTHALMOLOGY

## 2025-04-28 ASSESSMENT — KERATOMETRY
OS_AXISANGLE_DEGREES: 106
OD_K1POWER_DIOPTERS: 43.25
OD_AXISANGLE_DEGREES: 007
OD_K2POWER_DIOPTERS: 46.25
OS_K2POWER_DIOPTERS: 45.25
OS_K1POWER_DIOPTERS: 43.75
METHOD_AUTO_MANUAL: AUTO

## 2025-04-28 ASSESSMENT — REFRACTION_CURRENTRX
OS_AXIS: 13
OD_ADD: +2.75
OD_VPRISM_DIRECTION: PROGS
OS_ADD: +2.75
OS_VPRISM_DIRECTION: PROGS
OD_CYLINDER: -2.75
OS_CYLINDER: -1.75
OD_OVR_VA: 20/
OD_AXIS: 13
OS_OVR_VA: 20/
OD_SPHERE: -0.75
OS_SPHERE: -1.25

## 2025-04-28 ASSESSMENT — REFRACTION_MANIFEST
OD_CYLINDER: -0.50
OD_SPHERE: -0.25
OS_SPHERE: PLANO
OD_ADD: +2.75
OD_AXIS: 005
OS_AXIS: 160
OS_CYLINDER: -0.75
OD_VA1: 20/40-1
OS_ADD: +2.75
OS_VA1: 20/25

## 2025-04-28 ASSESSMENT — SUPERFICIAL PUNCTATE KERATITIS (SPK)
OS_SPK: T
OD_SPK: T

## 2025-04-28 ASSESSMENT — REFRACTION_AUTOREFRACTION
OS_CYLINDER: -0.75
OD_AXIS: 176
OD_SPHERE: -0.50
OS_AXIS: 160
OS_SPHERE: PLANO
OD_CYLINDER: -1.75

## 2025-04-28 ASSESSMENT — VISUAL ACUITY
OS_BCVA: 20/30+
OD_BCVA: 20/25+

## 2025-05-14 ENCOUNTER — OFFICE (OUTPATIENT)
Dept: URBAN - METROPOLITAN AREA CLINIC 6 | Facility: CLINIC | Age: 60
Setting detail: OPHTHALMOLOGY
End: 2025-05-14
Payer: COMMERCIAL

## 2025-05-14 DIAGNOSIS — H52.7: ICD-10-CM

## 2025-05-14 DIAGNOSIS — H50.15: ICD-10-CM

## 2025-05-14 PROCEDURE — 92015 DETERMINE REFRACTIVE STATE: CPT | Performed by: OPHTHALMOLOGY

## 2025-05-14 PROCEDURE — 99214 OFFICE O/P EST MOD 30 MIN: CPT | Performed by: OPHTHALMOLOGY

## 2025-05-14 PROCEDURE — 92060 SENSORIMOTOR EXAMINATION: CPT | Performed by: OPHTHALMOLOGY

## 2025-05-14 ASSESSMENT — KERATOMETRY
OS_K1POWER_DIOPTERS: 43.50
OD_K2POWER_DIOPTERS: 46.00
OS_AXISANGLE_DEGREES: 100
OS_K2POWER_DIOPTERS: 45.75
OD_K1POWER_DIOPTERS: 43.25
METHOD_AUTO_MANUAL: AUTO
OD_AXISANGLE_DEGREES: 092

## 2025-05-14 ASSESSMENT — REFRACTION_AUTOREFRACTION
OD_SPHERE: -0.50
OS_CYLINDER: -0.75
OS_SPHERE: -0.25
OD_CYLINDER: -1.00
OS_AXIS: 169
OD_AXIS: 176

## 2025-05-14 ASSESSMENT — REFRACTION_MANIFEST
OU_VA: 20/20-2
OS_SPHERE: -0.25
OS_VPRISM_DIRECTION: BI
OS_VA1: 20/20-2
OD_ADD: +2.50
OD_AXIS: 010
OD_SPHERE: -0.50
OD_VA1: 20/25+2
OS_CYLINDER: -0.50
OD_VPRISM_DIRECTION: BI
OD_HPRISM: 5
OS_AXIS: 165
OS_HPRISM: 5
OS_ADD: +2.50
OD_CYLINDER: -0.50

## 2025-05-14 ASSESSMENT — REFRACTION_CURRENTRX
OD_CYLINDER: -2.75
OS_ADD: +2.75
OS_SPHERE: -1.25
OD_ADD: +2.75
OS_CYLINDER: -1.75
OS_OVR_VA: 20/
OS_AXIS: 13
OD_SPHERE: -0.75
OD_OVR_VA: 20/
OD_AXIS: 13
OD_VPRISM_DIRECTION: PROGS
OS_VPRISM_DIRECTION: PROGS

## 2025-05-14 ASSESSMENT — SUPERFICIAL PUNCTATE KERATITIS (SPK)
OS_SPK: T
OD_SPK: T

## 2025-05-14 ASSESSMENT — VISUAL ACUITY
OS_BCVA: 20/30+
OD_BCVA: 20/25+

## 2025-05-14 ASSESSMENT — CONFRONTATIONAL VISUAL FIELD TEST (CVF)
OS_FINDINGS: FULL
OD_FINDINGS: FULL

## 2025-05-14 ASSESSMENT — TONOMETRY
OD_IOP_MMHG: 13
OS_IOP_MMHG: 18

## 2025-09-09 ENCOUNTER — APPOINTMENT (OUTPATIENT)
Dept: MRI IMAGING | Facility: CLINIC | Age: 60
End: 2025-09-09
Payer: COMMERCIAL

## 2025-09-09 PROCEDURE — 72197 MRI PELVIS W/O & W/DYE: CPT | Mod: 26
